# Patient Record
Sex: FEMALE | Race: WHITE | NOT HISPANIC OR LATINO | Employment: PART TIME | URBAN - METROPOLITAN AREA
[De-identification: names, ages, dates, MRNs, and addresses within clinical notes are randomized per-mention and may not be internally consistent; named-entity substitution may affect disease eponyms.]

---

## 2023-03-23 ENCOUNTER — APPOINTMENT (OUTPATIENT)
Dept: PRIMARY CARE | Facility: CLINIC | Age: 43
End: 2023-03-23
Payer: COMMERCIAL

## 2023-04-20 RX ORDER — VENLAFAXINE HYDROCHLORIDE 37.5 MG/1
37.5 CAPSULE, EXTENDED RELEASE ORAL DAILY
COMMUNITY
End: 2023-04-26 | Stop reason: SDUPTHER

## 2023-04-26 DIAGNOSIS — F41.9 ANXIETY: Primary | ICD-10-CM

## 2023-04-26 RX ORDER — VENLAFAXINE HYDROCHLORIDE 37.5 MG/1
37.5 CAPSULE, EXTENDED RELEASE ORAL DAILY
Qty: 90 CAPSULE | Refills: 0 | Status: SHIPPED | OUTPATIENT
Start: 2023-04-26 | End: 2023-07-31 | Stop reason: SDUPTHER

## 2023-04-26 NOTE — TELEPHONE ENCOUNTER
Patient left voicemail on provider refill line for:    Name of medication & dose:  Veblafaxine  Quantity & refills requested: as per last time  Amount of medication remainin days  If out of medication, for how long?      Last office visit:  22  Last labs (if applicable):    Future appointment?  23    Pharmacy verified.  [facility] [place] on file  Allergies updated.       The patient was informed the requested medication request will be processed within 3 business days unless they are contacted by a staff member to advise otherwise.

## 2023-05-11 ENCOUNTER — OFFICE VISIT (OUTPATIENT)
Dept: PRIMARY CARE | Facility: CLINIC | Age: 43
End: 2023-05-11
Payer: COMMERCIAL

## 2023-05-11 VITALS
OXYGEN SATURATION: 97 % | HEART RATE: 92 BPM | WEIGHT: 160 LBS | SYSTOLIC BLOOD PRESSURE: 118 MMHG | DIASTOLIC BLOOD PRESSURE: 78 MMHG

## 2023-05-11 DIAGNOSIS — R42 VERTIGO: ICD-10-CM

## 2023-05-11 DIAGNOSIS — F41.9 ANXIETY: Primary | ICD-10-CM

## 2023-05-11 PROBLEM — G47.9 SLEEP DISTURBANCE: Status: RESOLVED | Noted: 2017-10-16 | Resolved: 2023-05-11

## 2023-05-11 PROBLEM — Z15.89 HOMOZYGOUS FOR MTHFR GENE MUTATION: Status: ACTIVE | Noted: 2021-11-16

## 2023-05-11 PROBLEM — L30.9 ECZEMA: Status: ACTIVE | Noted: 2017-10-16

## 2023-05-11 PROBLEM — R87.610 ASCUS OF CERVIX WITH NEGATIVE HIGH RISK HPV: Status: RESOLVED | Noted: 2017-03-01 | Resolved: 2023-05-11

## 2023-05-11 PROBLEM — Z86.2 HISTORY OF IRON DEFICIENCY ANEMIA: Status: RESOLVED | Noted: 2023-05-11 | Resolved: 2023-05-11

## 2023-05-11 PROBLEM — N84.0 ENDOMETRIAL POLYP: Status: RESOLVED | Noted: 2022-05-23 | Resolved: 2023-05-11

## 2023-05-11 PROBLEM — F52.0 HYPOACTIVE SEXUAL DESIRE DISORDER: Status: RESOLVED | Noted: 2023-05-11 | Resolved: 2023-05-11

## 2023-05-11 PROBLEM — E55.9 VITAMIN D DEFICIENCY: Status: RESOLVED | Noted: 2017-03-01 | Resolved: 2023-05-11

## 2023-05-11 PROBLEM — N95.1 PERIMENOPAUSE: Status: ACTIVE | Noted: 2021-08-09

## 2023-05-11 PROCEDURE — 1036F TOBACCO NON-USER: CPT | Performed by: FAMILY MEDICINE

## 2023-05-11 PROCEDURE — 99213 OFFICE O/P EST LOW 20 MIN: CPT | Performed by: FAMILY MEDICINE

## 2023-05-11 RX ORDER — ONDANSETRON 4 MG/1
TABLET, ORALLY DISINTEGRATING ORAL
COMMUNITY
Start: 2022-06-03

## 2023-05-11 RX ORDER — ESOMEPRAZOLE MAGNESIUM 40 MG/1
CAPSULE, DELAYED RELEASE ORAL
COMMUNITY

## 2023-05-11 RX ORDER — ERYTHROMYCIN 20 MG/G
GEL TOPICAL 2 TIMES DAILY
COMMUNITY
Start: 2022-10-18 | End: 2023-10-09

## 2023-05-11 RX ORDER — SUMATRIPTAN 20 MG/1
1 SPRAY NASAL EVERY 2 HOUR PRN
COMMUNITY
End: 2023-06-01 | Stop reason: SDUPTHER

## 2023-05-11 RX ORDER — ALPRAZOLAM 0.25 MG/1
1 TABLET ORAL 2 TIMES DAILY PRN
COMMUNITY
Start: 2018-10-04 | End: 2024-01-04 | Stop reason: SDUPTHER

## 2023-05-11 RX ORDER — FLUTICASONE PROPIONATE 50 MCG
SPRAY, SUSPENSION (ML) NASAL
COMMUNITY
Start: 2018-07-06

## 2023-05-11 RX ORDER — TIMOLOL MALEATE 2.5 MG/ML
1 SOLUTION/ DROPS OPHTHALMIC 2 TIMES DAILY
COMMUNITY
Start: 2022-06-03 | End: 2023-10-09

## 2023-05-11 ASSESSMENT — PATIENT HEALTH QUESTIONNAIRE - PHQ9
SUM OF ALL RESPONSES TO PHQ9 QUESTIONS 1 AND 2: 0
2. FEELING DOWN, DEPRESSED OR HOPELESS: NOT AT ALL
1. LITTLE INTEREST OR PLEASURE IN DOING THINGS: NOT AT ALL

## 2023-05-25 DIAGNOSIS — G43.119 INTRACTABLE MIGRAINE WITH AURA WITHOUT STATUS MIGRAINOSUS: Primary | ICD-10-CM

## 2023-05-26 RX ORDER — SUMATRIPTAN 20 MG/1
SPRAY NASAL
Refills: 2 | OUTPATIENT
Start: 2023-05-26

## 2023-05-26 NOTE — PROGRESS NOTES
Subjective   Patient ID: Qiana Stevenson is a 42 y.o. female who presents for Anxiety.    She continues to wean her Effexor extremely slowly by opening up the capsules and counting out the granules.  She admits that her anxiety is getting worse and she knows she needs to take something, but she does not want to be Effexor.  She is also concerned that her vertiginous migraines are getting more symptomatic, partly because of her anxiety.    Her previous [facility] testing is reviewed after I was able to find in the chart    Histories reviewed      Objective   /78   Pulse 92   Wt 72.6 kg (160 lb)   SpO2 97%    Physical Exam  Alert adult woman, NAD  Affect pleasant      Problem List Items Addressed This Visit          Other    Vertigo    Relevant Medications    vortioxetine (Trintellix) 5 mg tablet tablet     Other Visit Diagnoses       Anxiety    -  Primary    Relevant Medications    vortioxetine (Trintellix) 5 mg tablet tablet        After much discussion she agreed to try something new.  Personally, I think if she adds very low-dose Trintellix, she will break it in half at first, that she will be able to wean off the Effexor a little faster.  I would like to see her back in just a couple months.

## 2023-06-01 RX ORDER — SUMATRIPTAN 20 MG/1
SPRAY NASAL
Qty: 6 EACH | Refills: 1 | Status: SHIPPED | OUTPATIENT
Start: 2023-06-01 | End: 2023-07-31

## 2023-06-01 NOTE — TELEPHONE ENCOUNTER
----- Message from Qiana Stevenson sent at 6/1/2023  4:18 PM EDT -----  Regarding: Your Recent Visit  Contact: [phone]  Hello! Can you please refill my Sumatriptan nasal spray? I don't think it went through and for some reason I can't request a refill through the kody.     Thank you!

## 2023-07-30 DIAGNOSIS — G43.119 INTRACTABLE MIGRAINE WITH AURA WITHOUT STATUS MIGRAINOSUS: ICD-10-CM

## 2023-07-31 DIAGNOSIS — F41.9 ANXIETY: ICD-10-CM

## 2023-07-31 RX ORDER — VENLAFAXINE HYDROCHLORIDE 37.5 MG/1
37.5 CAPSULE, EXTENDED RELEASE ORAL DAILY
Qty: 90 CAPSULE | Refills: 0 | Status: SHIPPED | OUTPATIENT
Start: 2023-07-31 | End: 2023-10-25 | Stop reason: SDUPTHER

## 2023-07-31 RX ORDER — SUMATRIPTAN 20 MG/1
SPRAY NASAL
Qty: 6 EACH | Refills: 1 | Status: SHIPPED | OUTPATIENT
Start: 2023-07-31 | End: 2023-09-25

## 2023-07-31 NOTE — TELEPHONE ENCOUNTER
Patient left voicemail and sent Youtuot message. Only has 1 pill left for today.  Last visit 5/11/23

## 2023-09-25 DIAGNOSIS — G43.119 INTRACTABLE MIGRAINE WITH AURA WITHOUT STATUS MIGRAINOSUS: ICD-10-CM

## 2023-09-25 RX ORDER — SUMATRIPTAN 20 MG/1
SPRAY NASAL
Qty: 6 EACH | Refills: 1 | Status: SHIPPED | OUTPATIENT
Start: 2023-09-25 | End: 2023-11-20

## 2023-10-05 RX ORDER — ALPRAZOLAM 0.5 MG/1
TABLET ORAL AS NEEDED
COMMUNITY

## 2023-10-05 RX ORDER — ONDANSETRON 4 MG/1
4 TABLET, FILM COATED ORAL EVERY 8 HOURS PRN
COMMUNITY
Start: 2018-12-11

## 2023-10-05 RX ORDER — MINERAL OIL
ENEMA (ML) RECTAL
COMMUNITY
Start: 2023-04-27 | End: 2023-10-09

## 2023-10-05 RX ORDER — ALBUTEROL SULFATE 90 UG/1
1-2 AEROSOL, METERED RESPIRATORY (INHALATION)
COMMUNITY
Start: 2022-12-22 | End: 2023-10-09

## 2023-10-05 RX ORDER — TERCONAZOLE 8 MG/G
1 CREAM VAGINAL NIGHTLY
COMMUNITY
Start: 2022-12-27

## 2023-10-05 RX ORDER — BENZONATATE 200 MG/1
1 CAPSULE ORAL 3 TIMES DAILY PRN
COMMUNITY
Start: 2022-12-22 | End: 2023-10-09

## 2023-10-05 RX ORDER — TACROLIMUS 1 MG/G
OINTMENT TOPICAL 2 TIMES DAILY
COMMUNITY
Start: 2022-10-18 | End: 2023-10-09 | Stop reason: ALTCHOICE

## 2023-10-05 RX ORDER — RIBOFLAVIN (VITAMIN B2) 100 MG
100 TABLET ORAL DAILY
COMMUNITY
Start: 2017-03-27

## 2023-10-05 RX ORDER — LORATADINE 10 MG/1
10 TABLET ORAL
COMMUNITY

## 2023-10-05 RX ORDER — ACETAMINOPHEN 500 MG
1 TABLET ORAL
COMMUNITY
Start: 2017-03-28

## 2023-10-05 RX ORDER — PNV NO.95/FERROUS FUM/FOLIC AC 28MG-0.8MG
1 TABLET ORAL DAILY
COMMUNITY

## 2023-10-09 ENCOUNTER — OFFICE VISIT (OUTPATIENT)
Dept: DERMATOLOGY | Facility: CLINIC | Age: 43
End: 2023-10-09
Payer: COMMERCIAL

## 2023-10-09 DIAGNOSIS — L71.0 PERIORAL DERMATITIS: Primary | ICD-10-CM

## 2023-10-09 DIAGNOSIS — D22.9 MELANOCYTIC NEVUS, UNSPECIFIED LOCATION: ICD-10-CM

## 2023-10-09 DIAGNOSIS — L57.8 OTHER SKIN CHANGES DUE TO CHRONIC EXPOSURE TO NONIONIZING RADIATION: ICD-10-CM

## 2023-10-09 DIAGNOSIS — D18.01 HEMANGIOMA OF SKIN: ICD-10-CM

## 2023-10-09 DIAGNOSIS — R20.2 NOTALGIA PARESTHETICA: ICD-10-CM

## 2023-10-09 PROCEDURE — 1036F TOBACCO NON-USER: CPT | Performed by: STUDENT IN AN ORGANIZED HEALTH CARE EDUCATION/TRAINING PROGRAM

## 2023-10-09 PROCEDURE — 99204 OFFICE O/P NEW MOD 45 MIN: CPT | Performed by: STUDENT IN AN ORGANIZED HEALTH CARE EDUCATION/TRAINING PROGRAM

## 2023-10-09 RX ORDER — TACROLIMUS 1 MG/G
OINTMENT TOPICAL 2 TIMES DAILY
Qty: 60 G | Refills: 5 | Status: SHIPPED | OUTPATIENT
Start: 2023-10-09 | End: 2024-05-23 | Stop reason: ALTCHOICE

## 2023-10-09 NOTE — PROGRESS NOTES
Subjective     Qiana Stevenson is a 43 y.o. female who presents for the following: Skin Check (Discoloration and numbness on back started 12 years ago. Today numb and itchy.) and Rash (Hx of perioral dermatitis. Tacrolimus in the past with good response.).     Review of Systems:  No other skin or systemic complaints other than what is documented elsewhere in the note.    The following portions of the chart were reviewed this encounter and updated as appropriate:         Skin Cancer History  No skin cancer on file.      Specialty Problems          Dermatology Problems    Eczema        Objective   Well appearing patient in no apparent distress; mood and affect are within normal limits.    A full examination was performed including scalp, head, eyes, ears, nose, lips, neck, chest, axillae, abdomen, back, buttocks, bilateral upper extremities, bilateral lower extremities, hands, feet, fingers, toes, fingernails, and toenails. All findings within normal limits unless otherwise noted below.    Assessment/Plan   1. Perioral dermatitis  Head - Anterior (Face)  Erythema around mouth and nasolabial fold    Periorificial dermatitis- discussed diagnosis, subtype of rosacea. STOP steroid use to face. Wash face after using Flonase. Start protopic 0.1% ointment BID to affected areas until resolved. Discussed black box warning.     tacrolimus (Protopic) 0.1 % ointment - Head - Anterior (Face)  Apply topically 2 times a day. To face, until rash clears    Related Procedures  Follow Up In Dermatology - Established Patient    2. Notalgia paresthetica  Right Upper Back  Hyperpigmented subscapular patch    Discussed diagnosis, associated with nerve impingement/spinal issues.  Leads to amyloid deposition and dysesthesia of back.  Start Cerave anti itch lotion as needed to area.  Advised fu with PCP regarding back pain for definitive treatment.     3. Other skin changes due to chronic exposure to nonionizing radiation  Mottled brown and  red discoloration    The risk of chronic, cumulative sun damage and risk of development of skin cancer was reviewed with the patient today. Discussed important of sun protection with sun protective clothing and/or broad spectrum sunscreen spf 30 or above.  Warning signs of skin cancer were reviewed. Patient to contact office should they notice any new or changing pre-existing skin lesion.    4. Melanocytic nevus, unspecified location  Benign to slightly atypical appearing brown pigmented macules and papules    Clinically benign appearing nevi, reassurance provided to the patient today. Discussed important of sun protection with sun protective clothing and/or broad spectrum sunscreen spf 30 or above.  ABCDEs of melanoma reviewed. Patient to f/u should they notice any new or changing pre-existing skin lesion.    5. Hemangioma of skin  Bright red papules    Discussed benign nature of condition, reassured. Reviewed warning signs of skin cancer with patient.    FU 1 year    Daria Jameson MD

## 2023-10-18 ENCOUNTER — OFFICE VISIT (OUTPATIENT)
Dept: PRIMARY CARE | Facility: CLINIC | Age: 43
End: 2023-10-18
Payer: COMMERCIAL

## 2023-10-18 VITALS — HEART RATE: 86 BPM | SYSTOLIC BLOOD PRESSURE: 128 MMHG | OXYGEN SATURATION: 98 % | DIASTOLIC BLOOD PRESSURE: 83 MMHG

## 2023-10-18 DIAGNOSIS — R35.0 INCREASED URINARY FREQUENCY: Primary | ICD-10-CM

## 2023-10-18 LAB
POC APPEARANCE, URINE: CLEAR
POC BILIRUBIN, URINE: NEGATIVE
POC BLOOD, URINE: NEGATIVE
POC COLOR, URINE: YELLOW
POC GLUCOSE, URINE: NEGATIVE MG/DL
POC KETONES, URINE: NEGATIVE MG/DL
POC LEUKOCYTES, URINE: NEGATIVE
POC NITRITE,URINE: NEGATIVE
POC PH, URINE: 6 PH
POC PROTEIN, URINE: NEGATIVE MG/DL
POC SPECIFIC GRAVITY, URINE: 1.01
POC UROBILINOGEN, URINE: 1 EU/DL

## 2023-10-18 PROCEDURE — 87086 URINE CULTURE/COLONY COUNT: CPT

## 2023-10-18 PROCEDURE — 1036F TOBACCO NON-USER: CPT | Performed by: FAMILY MEDICINE

## 2023-10-18 PROCEDURE — 81003 URINALYSIS AUTO W/O SCOPE: CPT | Performed by: FAMILY MEDICINE

## 2023-10-18 PROCEDURE — 99213 OFFICE O/P EST LOW 20 MIN: CPT | Performed by: FAMILY MEDICINE

## 2023-10-18 RX ORDER — ERGOCALCIFEROL 1.25 MG/1
CAPSULE ORAL
COMMUNITY
Start: 2023-10-02

## 2023-10-18 RX ORDER — PHENAZOPYRIDINE HYDROCHLORIDE 100 MG/1
100 TABLET, FILM COATED ORAL 3 TIMES DAILY PRN
Qty: 15 TABLET | Refills: 0 | Status: SHIPPED | OUTPATIENT
Start: 2023-10-18

## 2023-10-18 NOTE — PROGRESS NOTES
Subjective   Patient ID: Qiana Stevenson is a 43 y.o. female who presents for Urinary Frequency (Urinary burning and frequency for 3 days).  She also has a feeling of incomplete emptying.  She denies any ill symptoms or fever.  No nausea and no significant flank pain.  Last UTI was many years ago.    Histories reviewed      Objective   /83   Pulse 86   SpO2 98%    Physical Exam  Alert adult, NAD  Affect pleasant  Heart RRR no murmur  Lungs CTA bilat  No CVA tenderness    Problem List Items Addressed This Visit    None  Visit Diagnoses         Codes    Increased urinary frequency    -  Primary R35.0    Relevant Medications    phenazopyridine (Pyridium) 100 mg tablet    Other Relevant Orders    POCT UA Automated manually resulted (Completed)    Urine Culture (Completed)        Start Pyridium pending culture.  She did not want start antibiotics until the culture is back and I totally agree because her UA did not seem significantly positive.

## 2023-10-20 LAB — BACTERIA UR CULT: NO GROWTH

## 2023-10-25 DIAGNOSIS — F41.9 ANXIETY: ICD-10-CM

## 2023-10-26 RX ORDER — VENLAFAXINE HYDROCHLORIDE 37.5 MG/1
37.5 CAPSULE, EXTENDED RELEASE ORAL DAILY
Qty: 90 CAPSULE | Refills: 0 | Status: SHIPPED | OUTPATIENT
Start: 2023-10-26 | End: 2024-01-04 | Stop reason: SDUPTHER

## 2023-11-20 DIAGNOSIS — G43.119 INTRACTABLE MIGRAINE WITH AURA WITHOUT STATUS MIGRAINOSUS: ICD-10-CM

## 2023-11-20 RX ORDER — SUMATRIPTAN 20 MG/1
SPRAY NASAL
Qty: 6 EACH | Refills: 2 | Status: SHIPPED | OUTPATIENT
Start: 2023-11-20 | End: 2024-02-22 | Stop reason: SDUPTHER

## 2024-01-04 ENCOUNTER — OFFICE VISIT (OUTPATIENT)
Dept: PRIMARY CARE | Facility: CLINIC | Age: 44
End: 2024-01-04
Payer: COMMERCIAL

## 2024-01-04 VITALS
SYSTOLIC BLOOD PRESSURE: 109 MMHG | WEIGHT: 149 LBS | DIASTOLIC BLOOD PRESSURE: 74 MMHG | OXYGEN SATURATION: 97 % | HEART RATE: 76 BPM

## 2024-01-04 DIAGNOSIS — F41.9 ANXIETY: ICD-10-CM

## 2024-01-04 PROCEDURE — 99213 OFFICE O/P EST LOW 20 MIN: CPT | Performed by: FAMILY MEDICINE

## 2024-01-04 PROCEDURE — 1036F TOBACCO NON-USER: CPT | Performed by: FAMILY MEDICINE

## 2024-01-04 RX ORDER — ALPRAZOLAM 0.25 MG/1
0.25 TABLET ORAL 2 TIMES DAILY PRN
Qty: 30 TABLET | Refills: 0 | Status: SHIPPED | OUTPATIENT
Start: 2024-01-04

## 2024-01-04 RX ORDER — PROGESTERONE 100 MG/1
100 CAPSULE ORAL
COMMUNITY
Start: 2023-12-19

## 2024-01-04 RX ORDER — VENLAFAXINE HYDROCHLORIDE 37.5 MG/1
37.5 CAPSULE, EXTENDED RELEASE ORAL DAILY
Qty: 90 CAPSULE | Refills: 1 | Status: SHIPPED | OUTPATIENT
Start: 2024-01-04

## 2024-01-04 RX ORDER — ONDANSETRON 4 MG/1
4 TABLET, FILM COATED ORAL EVERY 8 HOURS PRN
Qty: 20 TABLET | Status: CANCELLED | OUTPATIENT
Start: 2024-01-04

## 2024-01-04 RX ORDER — ONDANSETRON 4 MG/1
4 TABLET, ORALLY DISINTEGRATING ORAL EVERY 8 HOURS PRN
Qty: 20 TABLET | Refills: 1 | Status: SHIPPED | OUTPATIENT
Start: 2024-01-04 | End: 2024-02-03

## 2024-01-04 NOTE — PROGRESS NOTES
Subjective   Patient ID: Qiana Stevenson is a 43 y.o. female who presents for Anxiety (Patient is here for anxiety follow up and med refill. ).  She continues to have a lot of stress with her kids, son is autistic and daughter has been struggling with severe anxiety and new autism diagnosis.  This is always a tough time of year for her.  She had been weaning off of her Effexor but in October she decided to go back up to the 37.5 mg dose and she is just staying on that for now.  She denies significant depression.    She had a bad GI bug a few weeks ago and had to get 3 liters fluids in the ER.  This has triggered her GI anxiety recently and she has used the xanax and zofran a little more than usual.  Last Xanax Rx was 15 months ago.      Histories reviewed      Objective   /74   Pulse 76   Wt 67.6 kg (149 lb)   LMP 12/13/2023   SpO2 97%    Physical Exam    Alert adult, NAD  Affect pleasant  Heart RRR no murmur  Lungs CTA bilat      Problem List Items Addressed This Visit             ICD-10-CM    Anxiety F41.9    Relevant Medications    ALPRAZolam (Xanax) 0.25 mg tablet    ondansetron ODT (Zofran-ODT) 4 mg disintegrating tablet    venlafaxine XR (Effexor-XR) 37.5 mg 24 hr capsule

## 2024-01-12 ENCOUNTER — APPOINTMENT (OUTPATIENT)
Dept: PRIMARY CARE | Facility: CLINIC | Age: 44
End: 2024-01-12
Payer: COMMERCIAL

## 2024-01-12 ENCOUNTER — TELEPHONE (OUTPATIENT)
Dept: PRIMARY CARE | Facility: CLINIC | Age: 44
End: 2024-01-12

## 2024-01-12 DIAGNOSIS — D50.9 IRON DEFICIENCY ANEMIA, UNSPECIFIED IRON DEFICIENCY ANEMIA TYPE: Primary | ICD-10-CM

## 2024-01-12 NOTE — TELEPHONE ENCOUNTER
Patient called with questions about her recent Pap smear which was abnormal.  When she was called about her abnormal Pap showing atypical glandular cells from her OB/GYN office, she also looked at some recent labs and urine results from an ER visit and was very concerned.  I reassured her about the Pap smear and she already is working to set up follow-up with an endometrial biopsy.  The labs are really not anything unusual and the urine abnormalities are consistent with the fact that she was dehydrated and had terrible viral gastroenteritis.  The only thing I think that needs follow-up is that her MCV and MCH were very low and she does have a history of iron deficiency anemia so we will repeat iron and CBC and she will have that done in the next week.    Anca Mckinley MD

## 2024-02-21 DIAGNOSIS — G43.119 INTRACTABLE MIGRAINE WITH AURA WITHOUT STATUS MIGRAINOSUS: ICD-10-CM

## 2024-02-22 RX ORDER — SUMATRIPTAN 20 MG/1
SPRAY NASAL
Refills: 2 | OUTPATIENT
Start: 2024-02-22

## 2024-02-22 RX ORDER — SUMATRIPTAN 20 MG/1
1 SPRAY NASAL DAILY PRN
Qty: 6 EACH | Refills: 2 | Status: SHIPPED | OUTPATIENT
Start: 2024-02-22 | End: 2024-05-21 | Stop reason: SDUPTHER

## 2024-02-22 NOTE — TELEPHONE ENCOUNTER
Left voicemail on prescription line  Last kody. 1/4/2024, for anxiety. Med given 11/20/2023 with 6 each and 2 refill.

## 2024-03-25 ENCOUNTER — OFFICE VISIT (OUTPATIENT)
Dept: PRIMARY CARE | Facility: CLINIC | Age: 44
End: 2024-03-25
Payer: COMMERCIAL

## 2024-03-25 DIAGNOSIS — D50.9 IRON DEFICIENCY ANEMIA, UNSPECIFIED IRON DEFICIENCY ANEMIA TYPE: ICD-10-CM

## 2024-03-25 DIAGNOSIS — F43.9 STRESS: ICD-10-CM

## 2024-03-25 PROCEDURE — 99214 OFFICE O/P EST MOD 30 MIN: CPT | Performed by: FAMILY MEDICINE

## 2024-03-25 PROCEDURE — 1036F TOBACCO NON-USER: CPT | Performed by: FAMILY MEDICINE

## 2024-03-25 PROCEDURE — 3008F BODY MASS INDEX DOCD: CPT | Performed by: FAMILY MEDICINE

## 2024-03-25 NOTE — PROGRESS NOTES
Subjective   Patient ID: Qiana Stevenson is a 43 y.o. female who presents for Obesity (Patient is here to discuss her weight gain. She says the last year has been really difficult for her to maintain her weight. ).    Her weight in early 2021 was 132 lbs and one year later was up to 155 lbs.  Actually over the past year her weight has been stable.    Current diet is pretty healthy but not restrictive in any way.      She is very anemic and iron def again and will be having iron infusions again.  She gets winded walking up a flight of stairs, so she cannot exercise fully.  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   She is a little overworked and it is stressful.  She sleeping enough.  Her marriage is fine.      She is still on 37.5 mg effexor daily and it is helping her anxiety a lot and helps the vestibular stuff a lot also.  When she tries to wean lower the vertigo gets worse.  So many thinks affect her migraines also so they are unpredictable.     Histories reviewed      Objective   /76   Pulse 77   Ht 1.524 m (5')   Wt 68 kg (150 lb)   LMP 03/04/2024   SpO2 98%   BMI 29.29 kg/m²    Physical Exam    Alert adult, NAD  Affect pleasant  Heart RRR no murmur  Lungs CTA bilat      Problem List Items Addressed This Visit    None  Visit Diagnoses         Codes    BMI 29.0-29.9,adult    -  Primary Z68.29    Stress     F43.9    Iron deficiency anemia, unspecified iron deficiency anemia type     D50.9          We talked at length about her weight and how it is affected by her stress, and the many other factors, exspecially her iron def anemia.  We discussed dieting, and increasing her activity when the anemia is better.  I think talk therapy for herself is a good consideration also.

## 2024-03-27 DIAGNOSIS — L71.0 PERIORAL DERMATITIS: Primary | ICD-10-CM

## 2024-03-27 RX ORDER — METRONIDAZOLE 7.5 MG/G
1 CREAM TOPICAL DAILY
Qty: 45 G | Refills: 11 | Status: SHIPPED | OUTPATIENT
Start: 2024-03-27

## 2024-04-07 VITALS
BODY MASS INDEX: 29.45 KG/M2 | OXYGEN SATURATION: 98 % | SYSTOLIC BLOOD PRESSURE: 124 MMHG | DIASTOLIC BLOOD PRESSURE: 76 MMHG | HEART RATE: 77 BPM | WEIGHT: 150 LBS | HEIGHT: 60 IN

## 2024-05-21 ENCOUNTER — TELEPHONE (OUTPATIENT)
Dept: PRIMARY CARE | Facility: CLINIC | Age: 44
End: 2024-05-21
Payer: COMMERCIAL

## 2024-05-21 DIAGNOSIS — G43.119 INTRACTABLE MIGRAINE WITH AURA WITHOUT STATUS MIGRAINOSUS: ICD-10-CM

## 2024-05-22 RX ORDER — SUMATRIPTAN 20 MG/1
1 SPRAY NASAL DAILY PRN
Qty: 6 EACH | Refills: 2 | Status: SHIPPED | OUTPATIENT
Start: 2024-05-22

## 2024-05-23 DIAGNOSIS — L71.0 PERIORIFICIAL DERMATITIS: Primary | ICD-10-CM

## 2024-05-23 RX ORDER — TACROLIMUS 1 MG/G
OINTMENT TOPICAL 2 TIMES DAILY
Qty: 60 G | Refills: 11 | Status: SHIPPED | OUTPATIENT
Start: 2024-05-23 | End: 2025-05-23

## 2024-07-11 ENCOUNTER — TELEPHONE (OUTPATIENT)
Dept: PRIMARY CARE | Facility: CLINIC | Age: 44
End: 2024-07-11
Payer: COMMERCIAL

## 2024-07-11 DIAGNOSIS — F41.9 ANXIETY: ICD-10-CM

## 2024-07-11 RX ORDER — VENLAFAXINE HYDROCHLORIDE 37.5 MG/1
37.5 CAPSULE, EXTENDED RELEASE ORAL DAILY
Qty: 90 CAPSULE | Refills: 0 | Status: SHIPPED | OUTPATIENT
Start: 2024-07-11

## 2024-08-12 ENCOUNTER — TELEPHONE (OUTPATIENT)
Dept: PRIMARY CARE | Facility: CLINIC | Age: 44
End: 2024-08-12
Payer: COMMERCIAL

## 2024-08-15 ENCOUNTER — TELEMEDICINE (OUTPATIENT)
Dept: PRIMARY CARE | Facility: CLINIC | Age: 44
End: 2024-08-15
Payer: COMMERCIAL

## 2024-08-15 DIAGNOSIS — U07.1 COVID-19: Primary | ICD-10-CM

## 2024-08-15 PROCEDURE — 1036F TOBACCO NON-USER: CPT | Performed by: FAMILY MEDICINE

## 2024-08-15 PROCEDURE — 99213 OFFICE O/P EST LOW 20 MIN: CPT | Performed by: FAMILY MEDICINE

## 2024-08-15 RX ORDER — BENZONATATE 200 MG/1
200 CAPSULE ORAL 3 TIMES DAILY PRN
Qty: 42 CAPSULE | Refills: 0 | Status: SHIPPED | OUTPATIENT
Start: 2024-08-15 | End: 2024-09-14

## 2024-08-15 ASSESSMENT — ENCOUNTER SYMPTOMS
WHEEZING: 0
FEVER: 1
HEMOPTYSIS: 0
COUGH: 1
SORE THROAT: 1
HEADACHES: 1
WEIGHT LOSS: 0
SHORTNESS OF BREATH: 0
RHINORRHEA: 1
HEARTBURN: 0
MYALGIAS: 0
CHILLS: 1
SWEATS: 0

## 2024-08-15 NOTE — PROGRESS NOTES
This is a VIRTUAL/TELEPHONE visit in place of a scheduled office visit due to current Covid-19 situation.  Pt is informed at the beginning of the call that he/she may be billed for this virtual/telephone appointment and if the insurance company does not cover this service, the pt may be billed by .    Total phone visit time: 15 minutes    Subjective   Patient ID: Qiana Stevenson is a 43 y.o. female who presents for Covid-19 Home Monitoring Video Visit (Patient tested positive for COVID. Symptoms include cough, fever,chills and congestion. The sxs started Monday night. ). The cough is the part for pt today, but yesterday was headache and fever.      Strep [facility] neg [facility], and she told them covid test was neg 2 days ago.  They advised her to test again and was positive.  She does not want Paxlovid, reviewed as an option.      Histories reviewed        Answers submitted by the patient for this visit:  Cough Questionnaire (Submitted on 8/15/2024)  Chief Complaint: Cough  Chronicity: new  Onset: in the past 7 days  Progression since onset: rapidly worsening  Frequency: every few minutes  Cough characteristics: non-productive  chest pain: No  chills: Yes  ear congestion: No  ear pain: No  fever: Yes  headaches: Yes  heartburn: No  hemoptysis: No  myalgias: No  nasal congestion: Yes  postnasal drip: Yes  rash: No  rhinorrhea: Yes  shortness of breath: No  sore throat: Yes  sweats: No  weight loss: No  wheezing: No  Aggravated by: lying down      Objective   There were no vitals taken for this visit.   Physical Exam    Alert adult woman, very hoarse voice.  She can speak normally  No resp distress but lots of cough.    Problem List Items Addressed This Visit    None  Visit Diagnoses         Codes    COVID-19    -  Primary U07.1    Relevant Medications    benzonatate (Tessalon) 200 mg capsule          Reviewed current isolation versus quarantine guidelines.  Reviewed guidelines for day 0 through day  10.  Reviewed symptom treatment with over-the-counter medications.    Reviewed what to watch for and when to call the doctor.  Discussed Paxlovid as an option, pros and cons.

## 2024-09-09 ENCOUNTER — HOSPITAL ENCOUNTER (OUTPATIENT)
Dept: RADIOLOGY | Facility: CLINIC | Age: 44
Discharge: HOME | End: 2024-09-09
Payer: COMMERCIAL

## 2024-09-09 ENCOUNTER — OFFICE VISIT (OUTPATIENT)
Dept: PRIMARY CARE | Facility: CLINIC | Age: 44
End: 2024-09-09
Payer: COMMERCIAL

## 2024-09-09 VITALS
DIASTOLIC BLOOD PRESSURE: 59 MMHG | SYSTOLIC BLOOD PRESSURE: 107 MMHG | BODY MASS INDEX: 28.32 KG/M2 | WEIGHT: 145 LBS | HEART RATE: 96 BPM | OXYGEN SATURATION: 96 % | TEMPERATURE: 98.3 F

## 2024-09-09 DIAGNOSIS — J40 BRONCHITIS: Primary | ICD-10-CM

## 2024-09-09 DIAGNOSIS — J40 BRONCHITIS: ICD-10-CM

## 2024-09-09 PROCEDURE — 99213 OFFICE O/P EST LOW 20 MIN: CPT | Performed by: FAMILY MEDICINE

## 2024-09-09 PROCEDURE — 71046 X-RAY EXAM CHEST 2 VIEWS: CPT | Performed by: RADIOLOGY

## 2024-09-09 PROCEDURE — 71046 X-RAY EXAM CHEST 2 VIEWS: CPT

## 2024-09-09 RX ORDER — DOXYCYCLINE 100 MG/1
100 CAPSULE ORAL 2 TIMES DAILY
Qty: 14 CAPSULE | Refills: 0 | Status: SHIPPED | OUTPATIENT
Start: 2024-09-09 | End: 2024-09-16

## 2024-09-09 RX ORDER — MOMETASONE FUROATE 220 UG/1
1 INHALANT RESPIRATORY (INHALATION) DAILY
Qty: 1 EACH | Refills: 0 | Status: SHIPPED | OUTPATIENT
Start: 2024-09-09 | End: 2024-09-11 | Stop reason: SDUPTHER

## 2024-09-09 NOTE — PROGRESS NOTES
Subjective   Patient ID: Qiana Stevenson is a 43 y.o. female who presents for Chest Pain (Patient had COVID for 2 weeks and has had a cough since. Her chest is very congested and she has a deep cough that is getting worse. She is also suffering from fatigue. ).    Before she got better from Covid she feels like she caught something else.  They traveled to [place] and she started coughing a lot and feeling very tired.  She has been sick ever since.   She feels like she has never been this sick.      Histories reviewed      Objective   /59   Pulse 96   Temp 36.8 °C (98.3 °F)   Wt 65.8 kg (145 lb)   LMP 08/27/2024   SpO2 96%   BMI 28.32 kg/m²    Physical Exam    Alert adult, NAD, body wt normal  Affect pleasant and appropriate, very tired  Eyes: PERRLA, EOMI, clear bilat  Nose congested  Neck supple, No LAD, No thyromegaly  Heart RRR no murmur  Lungs CTA bilat  Skin warm dry and clear        Problem List Items Addressed This Visit    None  Visit Diagnoses         Codes    Bronchitis    -  Primary J40    Relevant Medications    doxycycline (Vibramycin) 100 mg capsule    Other Relevant Orders    XR chest 2 views (Completed)          I suggested zithromax for possible CAP, but pt insisted that zithromax gave her severe side effects in the past.    URI symptom relief discussed as follows:  Rest and frequent hydration with clear liquids  Cough drops and honey as needed for cough. 1 spoonful of honey by mouth at least 3-4 times a day  Frequent use of nasal saline for nasal congestion and runny nose.    Humidifier as needed  Decongestant as needed, for example OTC pseudoephedrine  Tylenol alternating with ibuprofen as needed for fever, body aches or headache

## 2024-09-10 ENCOUNTER — PATIENT MESSAGE (OUTPATIENT)
Dept: PRIMARY CARE | Facility: CLINIC | Age: 44
End: 2024-09-10
Payer: COMMERCIAL

## 2024-09-10 DIAGNOSIS — J40 BRONCHITIS: Primary | ICD-10-CM

## 2024-09-11 ENCOUNTER — TELEPHONE (OUTPATIENT)
Dept: PRIMARY CARE | Facility: CLINIC | Age: 44
End: 2024-09-11
Payer: COMMERCIAL

## 2024-09-11 DIAGNOSIS — J40 BRONCHITIS: ICD-10-CM

## 2024-09-11 RX ORDER — FLUTICASONE PROPIONATE AND SALMETEROL 250; 50 UG/1; UG/1
1 POWDER RESPIRATORY (INHALATION)
Qty: 60 EACH | Refills: 0 | Status: SHIPPED | OUTPATIENT
Start: 2024-09-11 | End: 2025-09-11

## 2024-09-11 RX ORDER — MOMETASONE FUROATE 220 UG/1
1 INHALANT RESPIRATORY (INHALATION) DAILY
Qty: 1 EACH | Refills: 0 | Status: SHIPPED | OUTPATIENT
Start: 2024-09-11

## 2024-09-11 NOTE — TELEPHONE ENCOUNTER
Pt spouse called requesting med to go to different pharmacy. Pt spouse stated [facility] where [facility] is currently out of stock of med. New pharmacy on file.Last OV 9.9.24.

## 2024-09-12 ENCOUNTER — PATIENT MESSAGE (OUTPATIENT)
Dept: PRIMARY CARE | Facility: CLINIC | Age: 44
End: 2024-09-12
Payer: COMMERCIAL

## 2024-09-12 DIAGNOSIS — J18.9 PNEUMONIA DUE TO INFECTIOUS ORGANISM, UNSPECIFIED LATERALITY, UNSPECIFIED PART OF LUNG: Primary | ICD-10-CM

## 2024-09-12 RX ORDER — CEPHALEXIN 500 MG/1
500 CAPSULE ORAL 4 TIMES DAILY
Qty: 28 CAPSULE | Refills: 0 | Status: SHIPPED | OUTPATIENT
Start: 2024-09-12 | End: 2024-09-19

## 2024-09-17 ENCOUNTER — PATIENT MESSAGE (OUTPATIENT)
Dept: PRIMARY CARE | Facility: CLINIC | Age: 44
End: 2024-09-17

## 2024-09-17 ENCOUNTER — APPOINTMENT (OUTPATIENT)
Dept: PRIMARY CARE | Facility: CLINIC | Age: 44
End: 2024-09-17
Payer: COMMERCIAL

## 2024-09-17 DIAGNOSIS — J18.9 COMMUNITY ACQUIRED PNEUMONIA, UNSPECIFIED LATERALITY: Primary | ICD-10-CM

## 2024-09-18 ENCOUNTER — OFFICE VISIT (OUTPATIENT)
Dept: PULMONOLOGY | Facility: CLINIC | Age: 44
End: 2024-09-18
Payer: COMMERCIAL

## 2024-09-18 ENCOUNTER — HOSPITAL ENCOUNTER (OUTPATIENT)
Dept: RADIOLOGY | Facility: HOSPITAL | Age: 44
Discharge: HOME | End: 2024-09-18
Payer: COMMERCIAL

## 2024-09-18 VITALS
SYSTOLIC BLOOD PRESSURE: 95 MMHG | BODY MASS INDEX: 28.66 KG/M2 | WEIGHT: 146 LBS | TEMPERATURE: 97.9 F | HEART RATE: 73 BPM | DIASTOLIC BLOOD PRESSURE: 61 MMHG | HEIGHT: 60 IN | OXYGEN SATURATION: 96 %

## 2024-09-18 DIAGNOSIS — J18.9 COMMUNITY ACQUIRED PNEUMONIA, UNSPECIFIED LATERALITY: ICD-10-CM

## 2024-09-18 PROCEDURE — 71046 X-RAY EXAM CHEST 2 VIEWS: CPT

## 2024-09-18 PROCEDURE — 3008F BODY MASS INDEX DOCD: CPT

## 2024-09-18 PROCEDURE — 99204 OFFICE O/P NEW MOD 45 MIN: CPT

## 2024-09-18 PROCEDURE — 1036F TOBACCO NON-USER: CPT

## 2024-09-18 RX ORDER — PREDNISONE 10 MG/1
TABLET ORAL
Qty: 30 TABLET | Refills: 0 | Status: SHIPPED | OUTPATIENT
Start: 2024-09-18 | End: 2024-10-18

## 2024-09-18 RX ORDER — ALBUTEROL SULFATE 0.83 MG/ML
2.5 SOLUTION RESPIRATORY (INHALATION) 4 TIMES DAILY PRN
Qty: 60 ML | Refills: 3 | Status: SHIPPED | OUTPATIENT
Start: 2024-09-18 | End: 2025-09-18

## 2024-09-18 ASSESSMENT — ENCOUNTER SYMPTOMS
LOSS OF SENSATION IN FEET: 0
DEPRESSION: 0
OCCASIONAL FEELINGS OF UNSTEADINESS: 0

## 2024-09-18 ASSESSMENT — PAIN SCALES - GENERAL: PAINLEVEL: 5

## 2024-09-18 NOTE — PROGRESS NOTES
Patient: Qiana Stevenson    [id number]  : 1980 -- AGE 43 y.o.    Provider: Reanna VEGA- CNP     [place] [facility] [place]   Service Date: 2024              [facility]  New Visit Note        HISTORY OF PRESENT ILLNESS     The patient's referring provider is: Anca Mckinley MD    HISTORY OF PRESENT ILLNESS   Qiana Stevenson is a 43 y.o. female with a history of migraines, GERD, who is a never smoker, who presents to a [facility] for an initial evaluation for Pneumonia.     I have independently interviewed and examined the patient in the office and reviewed available records.    Current History    On today's visit, the patient reports testing positive for COVID the second week of August, her symptoms were pretty bad for about 2 weeks.  She reports she was starting to feel better just had a little lingering cough and some mild fatigue she went to [place] Labor Day weekend, her cough started to ramp back up, was feeling increase in fatigue walking around [place], had to go lay down, tested negative for COVID.  Went to see PCP on 2024 chest x-ray was suggestive for pneumonia, she was ordered doxyxline and an Asmanex inhaler. She reports having allergies to penicillins and has a hard time keeping down antibiotics, they make her stomach.  Doxycycline is making her throat so she was switched to cephalexin.  She has 1 or 2 days left of antibiotics with minimal improvement in symptoms.    She reports cough, shortness of breath, chest tightness, occasional wheezing.  Cough is not very productive.  Reports feeling better when she sleeps on her stomach.    Previous pulmonary history: She has no history of recurrent infections, or lung disease as a child.  she had no previous lung hx, never on oxygen or inhaler therapy.     Inhalers/nebulized medications: Asmanex    Hospitalization History: She has not been hospitalized over  the last year for breathing related problem.    Sleep history: Denies snoring, apnea, feeling tired during the day or taking naps during the day.       ALLERGIES AND MEDICATIONS     ALLERGIES  Allergies   Allergen Reactions    Iodine Anaphylaxis    Levofloxacin Rash    Penicillins Unknown, Dizziness, Other, Rash and Shortness of breath    Shellfish Derived Unknown, Hives and Anaphylaxis    Shellfish Containing Products Hives       MEDICATIONS  Current Outpatient Medications   Medication Sig Dispense Refill    ALPRAZolam (Xanax) 0.25 mg tablet Take 1 tablet (0.25 mg) by mouth 2 times a day as needed for anxiety. 30 tablet 0    ALPRAZolam (Xanax) 0.5 mg tablet if needed.      cephalexin (Keflex) 500 mg capsule Take 1 capsule (500 mg) by mouth 4 times a day for 7 days. 28 capsule 0    cholecalciferol (Vitamin D-3) 50 mcg (2,000 unit) capsule Take 1 capsule (50 mcg) by mouth.      cyanocobalamin (Vitamin B-12) 100 mcg tablet Take 1 tablet (100 mcg) by mouth once daily.      ergocalciferol (Vitamin D-2) 1.25 MG (86449 UT) capsule Take by mouth.      esomeprazole (NexIUM) 40 mg DR capsule TAKE 1 CAPSULE BY MOUTH DAILY TWICE DAILY      fluticasone (Flonase) 50 mcg/actuation nasal spray Administer into affected nostril(s).      fluticasone propion-salmeteroL (Advair Diskus) 250-50 mcg/dose diskus inhaler Inhale 1 puff 2 times a day. Rinse mouth with water after use to reduce aftertaste and incidence of candidiasis. Do not swallow. 60 each 0    loratadine (Claritin) 10 mg tablet Take 1 tablet (10 mg) by mouth.      metroNIDAZOLE (Metrocream) 0.75 % cream Apply 1 Application topically once daily. 45 g 11    mometasone (Asmanex Twisthaler) 220 mcg/ actuation (60) inhaler Inhale 1 puff once daily. 1 each 0    ondansetron (Zofran) 4 mg tablet Take 1 tablet (4 mg) by mouth every 8 hours if needed for vomiting or nausea.      ondansetron ODT (Zofran-ODT) 4 mg disintegrating tablet TAKE 1 TABLET BY MOUTH EVERY 6 HOURS AS NEEDED FOR  NAUSEA/VOMITING FOR UP TO 7 DAYS.      phenazopyridine (Pyridium) 100 mg tablet Take 1 tablet (100 mg) by mouth 3 times a day as needed for bladder spasms. 15 tablet 0    progesterone (Prometrium) 100 mg capsule Take 1 capsule (100 mg) by mouth once daily.      riboflavin (vitamin B2) 100 mg tablet tablet Take 1 tablet (100 mg) by mouth once daily.      SUMAtriptan (Imitrex) 20 mg/actuation nasal spray ADMINISTER 1 SPRAY (20 MG) INTO ONE NOSTRIL ONCE DAILY AS NEEDED FOR MIGRAINE. 6 each 2    tacrolimus (Protopic) 0.1 % ointment Apply topically 2 times a day. To face until rash is clear 60 g 11    terconazole (Terazol 3) 0.8 % vaginal cream Insert 1 applicator into the vagina once daily at bedtime.      venlafaxine XR (Effexor-XR) 37.5 mg 24 hr capsule Take 1 capsule (37.5 mg) by mouth once daily. 90 capsule 0    vortioxetine (Trintellix) 5 mg tablet tablet Take 1 tablet (5 mg) by mouth once daily. 30 tablet 1     No current facility-administered medications for this visit.         PAST HISTORY     PAST MEDICAL HISTORY  Anxiety  Migraine  GERD    PAST SURGICAL HISTORY  Past Surgical History:   Procedure Laterality Date    ENDOMETRIAL BIOPSY      polyp       IMMUNIZATION HISTORY  Immunization History   Administered Date(s) Administered    Pfizer COVID-19 vaccine, bivalent, age 12 years and older (30 mcg/0.3 mL) 11/18/2022    Pfizer Purple Cap SARS-CoV-2 03/17/2021, 04/07/2021, 11/07/2021    Tdap vaccine, age 7 year and older (BOOSTRIX, ADACEL) 08/27/2013       SOCIAL HISTORY  Tobacco Smoking: never  Smokeless Tobacco/Vaping: none  Illicit drugs: none  Alcohol consumption: rarely  Pets: dog    OCCUPATIONAL/ENVIRONMENTAL HISTORY  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  No exposure to birds or exotic animals.    FAMILY HISTORY  Family History   Problem Relation Name Age of Onset    Diabetes Mother      Hyperlipidemia Mother      Lung cancer Father       Hyperlipidemia Father      Heart attack Father      Depression Sister      Autism Son      Acute lymphoblastic leukemia Son       Mother - asthma  Son - lukemia  Father - lung cancer   No family history of autoimmune disorders.    REVIEW OF SYSTEMS     REVIEW OF SYSTEMS  Review of Systems    Constitutional: No fever, no chills, no night sweats.    Eyes: No double vision, no floaters, no dry eyes.   ENT: See HPI.   Neck: No neck stiffness.  Cardiovascular: No sharp chest pain, no heart racing, no leg swelling.  Respiratory: as noted in HPI.   Gastrointestinal: No nausea, no vomiting, no diarrhea.   Musculoskeletal: No joint pain, no back pain.   Integumentary: No rashes or sores.  Neurological: No dizziness, no headaches. Sleeping well.  Psychiatric: No mood changes.   Endocrine: No hot flashes, no cold intolerance, weight is stable.  Hematologic: No easy bruising or bleeding.    PHYSICAL EXAM     VITAL SIGNS: Ht 1.524 m (5')   Wt 66.2 kg (146 lb)   LMP 08/27/2024   BMI 28.51 kg/m²      CURRENT WEIGHT: Body mass index is 28.51 kg/m².  PREVIOUS WEIGHTS:  Wt Readings from Last 3 Encounters:   09/18/24 66.2 kg (146 lb)   09/09/24 65.8 kg (145 lb)   03/25/24 68 kg (150 lb)       Physical Exam    Constitutional: General appearance: Alert and oriented.  No acute distress. Well developed, well nourished.  Head and face: Symmetric  ENT: external inspection of ear and nose normal. No intranasal polyps. No oropharyngeal exudates.    Oropharynx: normal   Neck: supple, no lymphadenopathy  Pulmonary: Chest is normal. No increased work of breathing or signs of respiratory distress. Clear to auscultation bilaterally - no crackles, wheezing, or rhonchi.   Cardiovascular: Heart rate and rhythm normal. Normal S1, S2 - no murmurs, gallops, or pericardial rub.   Abdomen: Soft, non tender, +BS  Extremities: No edema. No clubbing or cyanosis of the fingernails.    Neurologic: Moves all four extremities   MSK: Normal movements of  "extremities. Gait normal   Psychiatric: Intact judgement and insight.    RESULTS/DATA     Pulmonary Function Test Results       Chest Radiograph     XR chest 2 views 09/09/2024    Narrative  Interpreted By:  Rachid Juárez,  STUDY:  XR CHEST 2 VIEWS;  9/9/2024 12:39 pm    INDICATION:  Signs/Symptoms:cough.    ,J40 Bronchitis, not specified as acute or chronic    COMPARISON:  None.    ACCESSION NUMBER(S):  [id number]    ORDERING CLINICIAN:  JAMEL GAITAN    FINDINGS:          CARDIOMEDIASTINAL SILHOUETTE:  Cardiomediastinal silhouette is normal in size and configuration.    LUNGS:  There is patchy airspace disease at the left lung base. The right  lung is clear. There is no effusion. There is no edema.    ABDOMEN:  No remarkable upper abdominal findings.    BONES:  No acute osseous changes.    Impression  1.  Patchy left basilar airspace disease with developing pneumonia in  the differential.        MACRO:  None    Signed by: Rachid Juárez 9/10/2024 9:44 PM  Dictation workstation:   [id number]      Chest CT Scan     No results found for this or any previous visit from the past 365 days.      Echocardiogram     No results found for this or any previous visit from the past 365 days.       Labwork     Lab Results   Component Value Date    WBC 6.9 07/01/2021    HGB 10.5 (L) 07/01/2021    HCT 35.3 (L) 07/01/2021    MCV 74 (L) 07/01/2021     07/01/2021      Lab Results   Component Value Date    GLUCOSE 94 01/14/2020    CALCIUM 9.5 01/14/2020     01/14/2020    K 4.2 01/14/2020    CO2 24 01/14/2020     01/14/2020    BUN 11 01/14/2020    CREATININE 0.82 01/14/2020      No results found for: \"ALT\", \"AST\", \"GGT\", \"ALKPHOS\", \"BILITOT\"     No results found for: \"PROTIME\", \"INR\", \"PTT\"    No results found for: \"ICIGE\", \"IGE\", \"ICA04\", \"ASPFU\", \"IGG\", \"IGA\", \"IGM\"    Peripheral Eosinophil Count/Percentage:   No results found for: \"EOSABS\", \"EOSPCT\"    ASSESSMENT/PLAN   Ms. Stevenson is a 43 y.o. female, " with a history of migraines, GERD, who is a never smoker, who presents to a [facility] for an initial evaluation for Pneumonia.     Will see how she feels in a week maybe order an different antibiotic.  Getting chest x-ray today to make sure no worsening pneumonia  If productive cough in 1 month can send cultures?    Problem List and Orders  Problem List Items Addressed This Visit    None  Visit Diagnoses       Community acquired pneumonia, unspecified laterality        Relevant Medications    predniSONE (Deltasone) 10 mg tablet    albuterol 2.5 mg /3 mL (0.083 %) nebulizer solution    Other Relevant Orders    Aerosol Machine for home use - Purchase            Assessment and Plan / Recommendations:  Problem List Items Addressed This Visit    None  Visit Diagnoses       Community acquired pneumonia, unspecified laterality                 Covid pneumonia  -Start prednisone taper  -Start albuterol nebulizers every 4-6 hours as needed for shortness of breath  -Will get chest x-ray today    Follow up in 1 month or sooner if needed.    If you have any questions please call the office [phone]    Thank you for visiting the Pulmonary clinic today!   Reanna Michel CNP  [phone]

## 2024-09-20 DIAGNOSIS — J18.9 COMMUNITY ACQUIRED PNEUMONIA, UNSPECIFIED LATERALITY: Primary | ICD-10-CM

## 2024-09-20 RX ORDER — METHYLPREDNISOLONE 4 MG/1
TABLET ORAL
Qty: 21 TABLET | Refills: 0 | Status: SHIPPED | OUTPATIENT
Start: 2024-09-20 | End: 2024-09-27

## 2024-10-10 DIAGNOSIS — F41.9 ANXIETY: ICD-10-CM

## 2024-10-11 RX ORDER — VENLAFAXINE HYDROCHLORIDE 37.5 MG/1
37.5 CAPSULE, EXTENDED RELEASE ORAL DAILY
Qty: 90 CAPSULE | Refills: 0 | Status: SHIPPED | OUTPATIENT
Start: 2024-10-11

## 2024-10-14 NOTE — PROGRESS NOTES
Patient: Qiana Stevenson    [id number]  : 1980 -- AGE 44 y.o.    Provider: Reanna VEGA- CNP     [place] [facility] [place]: 10/18/24              [facility]  Follow Up Visit Note        HISTORY OF PRESENT ILLNESS     The patient's referring provider is: No ref. provider found    HISTORY OF PRESENT ILLNESS   Qiana Stevenson is a 44 y.o. female with a history of migraines, GERD, who is a never smoker, who presents to a [facility] for a follow-up evaluation for pneumonia.    I have independently interviewed and examined the patient in the office and reviewed available records.    Current History    Since last visit she reports she is feeling significantly better, back to her baseline.  Has had no acute respiratory events.  She was unable to tolerate prednisone and Medrol Dosepak due to causing her an upset stomach, she was worried to get an ulcer.  She did use nebulizers which helped significantly.  Has appointment with PCP in a couple weeks, she is going to inquire about getting pneumonia vaccine.    24: On today's visit, the patient reports testing positive for COVID the second week of August, her symptoms were pretty bad for about 2 weeks.  She reports she was starting to feel better just had a little lingering cough and some mild fatigue she went to [place] weekend, her cough started to ramp back up, was feeling increase in fatigue walking around [place], had to go lay down, tested negative for COVID.  Went to see PCP on 2024 chest x-ray was suggestive for pneumonia, she was ordered doxyxline and an Asmanex inhaler. She reports having allergies to penicillins and has a hard time keeping down antibiotics, they make her stomach upset.  Doxycycline is making her throat sore so she was switched to cephalexin.  She has 1 or 2 days left of antibiotics with minimal improvement in symptoms.    She  reports cough, shortness of breath, chest tightness, occasional wheezing.  Cough is not very productive.  Reports feeling better when she sleeps on her stomach.    Previous pulmonary history: She has no history of recurrent infections, or lung disease as a child.  she had no previous lung hx, never on oxygen or inhaler therapy.     Inhalers/nebulized medications: Asmanex    Hospitalization History: She has not been hospitalized over the last year for breathing related problem.    Sleep history: Denies snoring, apnea, feeling tired during the day or taking naps during the day.       ALLERGIES AND MEDICATIONS     ALLERGIES  Allergies   Allergen Reactions    Iodine Anaphylaxis    Levofloxacin Rash    Penicillins Unknown, Dizziness, Other, Rash and Shortness of breath    Shellfish Derived Unknown, Hives and Anaphylaxis    Shellfish Containing Products Hives       MEDICATIONS  Current Outpatient Medications   Medication Sig Dispense Refill    albuterol 2.5 mg /3 mL (0.083 %) nebulizer solution Take 3 mL (2.5 mg) by nebulization 4 times a day as needed for wheezing or shortness of breath. 60 mL 3    ALPRAZolam (Xanax) 0.25 mg tablet Take 1 tablet (0.25 mg) by mouth 2 times a day as needed for anxiety. 30 tablet 0    ALPRAZolam (Xanax) 0.5 mg tablet if needed.      cholecalciferol (Vitamin D-3) 50 mcg (2,000 unit) capsule Take 1 capsule (50 mcg) by mouth.      cyanocobalamin (Vitamin B-12) 100 mcg tablet Take 1 tablet (100 mcg) by mouth once daily.      ergocalciferol (Vitamin D-2) 1.25 MG (56287 UT) capsule Take by mouth.      esomeprazole (NexIUM) 40 mg DR capsule TAKE 1 CAPSULE BY MOUTH DAILY TWICE DAILY      fluticasone (Flonase) 50 mcg/actuation nasal spray Administer into affected nostril(s).      fluticasone propion-salmeteroL (Advair Diskus) 250-50 mcg/dose diskus inhaler Inhale 1 puff 2 times a day. Rinse mouth with water after use to reduce aftertaste and incidence of candidiasis. Do not swallow. 60 each 0     loratadine (Claritin) 10 mg tablet Take 1 tablet (10 mg) by mouth.      metroNIDAZOLE (Metrocream) 0.75 % cream Apply 1 Application topically once daily. 45 g 11    mometasone (Asmanex Twisthaler) 220 mcg/ actuation (60) inhaler Inhale 1 puff once daily. 1 each 0    ondansetron (Zofran) 4 mg tablet Take 1 tablet (4 mg) by mouth every 8 hours if needed for vomiting or nausea.      ondansetron ODT (Zofran-ODT) 4 mg disintegrating tablet TAKE 1 TABLET BY MOUTH EVERY 6 HOURS AS NEEDED FOR NAUSEA/VOMITING FOR UP TO 7 DAYS.      phenazopyridine (Pyridium) 100 mg tablet Take 1 tablet (100 mg) by mouth 3 times a day as needed for bladder spasms. 15 tablet 0    predniSONE (Deltasone) 10 mg tablet Take 4 tabs (40mg) daily for 3 days, then 3 tabs (30mg) daily for 3 days,  2 tabs (20mg) daily for 3 days,  1 tab (10 mg) daily for 3 days. 30 tablet 0    progesterone (Prometrium) 100 mg capsule Take 1 capsule (100 mg) by mouth once daily.      riboflavin (vitamin B2) 100 mg tablet tablet Take 1 tablet (100 mg) by mouth once daily.      SUMAtriptan (Imitrex) 20 mg/actuation nasal spray ADMINISTER 1 SPRAY (20 MG) INTO ONE NOSTRIL ONCE DAILY AS NEEDED FOR MIGRAINE. 6 each 2    tacrolimus (Protopic) 0.1 % ointment Apply topically 2 times a day. To face until rash is clear 60 g 11    terconazole (Terazol 3) 0.8 % vaginal cream Insert 1 applicator into the vagina once daily at bedtime.      venlafaxine XR (Effexor-XR) 37.5 mg 24 hr capsule TAKE 1 CAPSULE BY MOUTH ONCE DAILY. 90 capsule 0    vortioxetine (Trintellix) 5 mg tablet tablet Take 1 tablet (5 mg) by mouth once daily. 30 tablet 1     No current facility-administered medications for this visit.         PAST HISTORY     PAST MEDICAL HISTORY  Anxiety  Migraine  GERD    PAST SURGICAL HISTORY  Past Surgical History:   Procedure Laterality Date    ENDOMETRIAL BIOPSY      polyp       IMMUNIZATION HISTORY  Immunization History   Administered Date(s) Administered    Pfizer COVID-19  vaccine, bivalent, age 12 years and older (30 mcg/0.3 mL) 11/18/2022    Pfizer Purple Cap SARS-CoV-2 03/17/2021, 04/07/2021, 11/07/2021    Tdap vaccine, age 7 year and older (BOOSTRIX, ADACEL) 08/27/2013       SOCIAL HISTORY  Tobacco Smoking: never  Smokeless Tobacco/Vaping: none  Illicit drugs: none  Alcohol consumption: rarely  Pets: dog    OCCUPATIONAL/ENVIRONMENTAL HISTORY  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  No exposure to birds or exotic animals.    FAMILY HISTORY  Family History   Problem Relation Name Age of Onset    Diabetes Mother      Hyperlipidemia Mother      Lung cancer Father      Hyperlipidemia Father      Heart attack Father      Depression Sister      Autism Son      Acute lymphoblastic leukemia Son       Mother - asthma  Son -leukemia  Father - lung cancer   No family history of autoimmune disorders.    REVIEW OF SYSTEMS     REVIEW OF SYSTEMS  Review of Systems    Constitutional: No fever, no chills, no night sweats.    Eyes: No double vision, no floaters, no dry eyes.   ENT: See HPI.   Neck: No neck stiffness.  Cardiovascular: No sharp chest pain, no heart racing, no leg swelling.  Respiratory: as noted in HPI.   Gastrointestinal: No nausea, no vomiting, no diarrhea.   Musculoskeletal: No joint pain, no back pain.   Integumentary: No rashes or sores.  Neurological: No dizziness, no headaches. Sleeping well.  Psychiatric: No mood changes.   Endocrine: No hot flashes, no cold intolerance, weight is stable.  Hematologic: No easy bruising or bleeding.    PHYSICAL EXAM     VITAL SIGNS:   Vitals:    10/18/24 1004   BP: 115/82   Pulse: 76   Resp: 18   Temp: 36.5 °C (97.7 °F)   SpO2: 99%          CURRENT WEIGHT: Body mass index is 28.87 kg/m².    PREVIOUS WEIGHTS:  Wt Readings from Last 3 Encounters:   09/18/24 66.2 kg (146 lb)   09/09/24 65.8 kg (145 lb)   03/25/24 68 kg (150 lb)       Physical  Exam    Constitutional: General appearance: Alert and oriented.  No acute distress. Well developed, well nourished.  Head and face: Symmetric  ENT: external inspection of ear and nose normal. No intranasal polyps. No oropharyngeal exudates.    Oropharynx: normal   Neck: supple, no lymphadenopathy  Pulmonary: Chest is normal. No increased work of breathing or signs of respiratory distress. Clear to auscultation bilaterally - no crackles, wheezing, or rhonchi.   Cardiovascular: Heart rate and rhythm normal. Normal S1, S2 - no murmurs, gallops, or pericardial rub.   Abdomen: Soft, non tender, +BS  Extremities: No edema. No clubbing or cyanosis of the fingernails.    Neurologic: Moves all four extremities   MSK: Normal movements of extremities. Gait normal   Psychiatric: Intact judgement and insight.    RESULTS/DATA     Pulmonary Function Test Results     No testing done     Chest Radiograph     XR CHEST 2 VIEWS;  9/18/2024       INDICATION:  Signs/Symptoms:pneumonia.      ,J18.9 Pneumonia, unspecified organism      COMPARISON:  09/09/2024      ACCESSION NUMBER(S):  [id number]      ORDERING CLINICIAN:  JAMEL GAITAN      FINDINGS:                  CARDIOMEDIASTINAL SILHOUETTE:  Cardiomediastinal silhouette is normal in size and configuration.      LUNGS:  Azygous lobe is present on the right, a normal variant. Patchy  infiltrate is present in the lingula consistent with pneumonia. Right  lung is clear. No pleural effusion is noted.      ABDOMEN:  No remarkable upper abdominal findings.      BONES:  No acute osseous changes. Dextro curve at the thoracolumbar junction  is unchanged.      IMPRESSION:  1.  Patchy infiltrate in the lingula consistent with pneumonia              MACRO:  None      Signed by: Zeenat Hanley 9/19/2024 8:26 AM  Dictation workstation:   [id number]    Chest CT Scan     No testing done       Echocardiogram     No testing done        Labwork     Lab Results   Component Value Date    WBC 6.9  "07/01/2021    HGB 10.5 (L) 07/01/2021    HCT 35.3 (L) 07/01/2021    MCV 74 (L) 07/01/2021     07/01/2021      Lab Results   Component Value Date    GLUCOSE 94 01/14/2020    CALCIUM 9.5 01/14/2020     01/14/2020    K 4.2 01/14/2020    CO2 24 01/14/2020     01/14/2020    BUN 11 01/14/2020    CREATININE 0.82 01/14/2020      No results found for: \"ALT\", \"AST\", \"GGT\", \"ALKPHOS\", \"BILITOT\"     No results found for: \"PROTIME\", \"INR\", \"PTT\"    No results found for: \"ICIGE\", \"IGE\", \"ICA04\", \"ASPFU\", \"IGG\", \"IGA\", \"IGM\"    Peripheral Eosinophil Count/Percentage:   No results found for: \"EOSABS\", \"EOSPCT\"    ASSESSMENT/PLAN   Ms. Stevenson is a 44 y.o. female, with a history of migraines, GERD, who is a never smoker, who presents to a [facility] for an initial evaluation for Pneumonia.     Will see how she feels in a week maybe order an different antibiotic.  Getting chest x-ray today to make sure no worsening pneumonia  If productive cough in 1 month can send cultures?    Problem List and Orders  Problem List Items Addressed This Visit    None        Assessment and Plan / Recommendations:  Problem List Items Addressed This Visit    None    Covid pneumonia  -Asymptomatic and feeling back to baseline    Follow up as needed.    If you have any questions please call the office [phone]    Thank you for visiting the Pulmonary clinic today!   Reanna Michel CNP  [phone]    "

## 2024-10-15 DIAGNOSIS — G43.119 INTRACTABLE MIGRAINE WITH AURA WITHOUT STATUS MIGRAINOSUS: ICD-10-CM

## 2024-10-15 DIAGNOSIS — F41.9 ANXIETY: ICD-10-CM

## 2024-10-18 ENCOUNTER — APPOINTMENT (OUTPATIENT)
Dept: PULMONOLOGY | Facility: CLINIC | Age: 44
End: 2024-10-18
Payer: COMMERCIAL

## 2024-10-18 VITALS
WEIGHT: 147.8 LBS | SYSTOLIC BLOOD PRESSURE: 115 MMHG | DIASTOLIC BLOOD PRESSURE: 82 MMHG | TEMPERATURE: 97.7 F | HEIGHT: 60 IN | BODY MASS INDEX: 29.02 KG/M2 | HEART RATE: 76 BPM | OXYGEN SATURATION: 99 % | RESPIRATION RATE: 18 BRPM

## 2024-10-18 DIAGNOSIS — J18.9 COMMUNITY ACQUIRED PNEUMONIA, UNSPECIFIED LATERALITY: Primary | ICD-10-CM

## 2024-10-18 PROCEDURE — 1036F TOBACCO NON-USER: CPT

## 2024-10-18 PROCEDURE — 99214 OFFICE O/P EST MOD 30 MIN: CPT

## 2024-10-18 PROCEDURE — 3008F BODY MASS INDEX DOCD: CPT

## 2024-10-18 ASSESSMENT — PAIN SCALES - GENERAL: PAINLEVEL_OUTOF10: 0-NO PAIN

## 2024-10-22 RX ORDER — SUMATRIPTAN 20 MG/1
1 SPRAY NASAL DAILY PRN
Qty: 6 EACH | Refills: 2 | Status: SHIPPED | OUTPATIENT
Start: 2024-10-22

## 2024-10-22 RX ORDER — ONDANSETRON 4 MG/1
4 TABLET, ORALLY DISINTEGRATING ORAL EVERY 12 HOURS PRN
Qty: 20 TABLET | Refills: 0 | Status: SHIPPED | OUTPATIENT
Start: 2024-10-22

## 2024-10-22 RX ORDER — ALPRAZOLAM 0.25 MG/1
0.25 TABLET ORAL 2 TIMES DAILY PRN
Qty: 30 TABLET | OUTPATIENT
Start: 2024-10-22

## 2024-11-12 ENCOUNTER — APPOINTMENT (OUTPATIENT)
Dept: PRIMARY CARE | Facility: CLINIC | Age: 44
End: 2024-11-12
Payer: COMMERCIAL

## 2024-11-12 VITALS
HEART RATE: 90 BPM | BODY MASS INDEX: 29.84 KG/M2 | WEIGHT: 148 LBS | SYSTOLIC BLOOD PRESSURE: 110 MMHG | HEIGHT: 59 IN | DIASTOLIC BLOOD PRESSURE: 80 MMHG | OXYGEN SATURATION: 98 %

## 2024-11-12 DIAGNOSIS — Z00.00 WELL ADULT EXAM: Primary | ICD-10-CM

## 2024-11-12 DIAGNOSIS — Z13.6 ENCOUNTER FOR SCREENING FOR CORONARY ARTERY DISEASE: ICD-10-CM

## 2024-11-12 DIAGNOSIS — G43.119 INTRACTABLE MIGRAINE WITH AURA WITHOUT STATUS MIGRAINOSUS: ICD-10-CM

## 2024-11-12 DIAGNOSIS — F41.9 ANXIETY: ICD-10-CM

## 2024-11-12 DIAGNOSIS — Z13.220 SCREENING FOR HYPERLIPIDEMIA: ICD-10-CM

## 2024-11-12 DIAGNOSIS — Z13.1 SCREENING FOR DIABETES MELLITUS: ICD-10-CM

## 2024-11-12 PROCEDURE — 3008F BODY MASS INDEX DOCD: CPT | Performed by: FAMILY MEDICINE

## 2024-11-12 PROCEDURE — 1036F TOBACCO NON-USER: CPT | Performed by: FAMILY MEDICINE

## 2024-11-12 PROCEDURE — 99396 PREV VISIT EST AGE 40-64: CPT | Performed by: FAMILY MEDICINE

## 2024-11-12 RX ORDER — VENLAFAXINE HYDROCHLORIDE 37.5 MG/1
37.5 CAPSULE, EXTENDED RELEASE ORAL DAILY
Qty: 90 CAPSULE | Refills: 3 | Status: SHIPPED | OUTPATIENT
Start: 2024-11-12

## 2024-11-12 RX ORDER — ALPRAZOLAM 0.25 MG/1
0.25 TABLET ORAL 2 TIMES DAILY PRN
Qty: 30 TABLET | Refills: 0 | Status: SHIPPED | OUTPATIENT
Start: 2024-11-12

## 2024-11-12 RX ORDER — ONDANSETRON 4 MG/1
4 TABLET, ORALLY DISINTEGRATING ORAL EVERY 12 HOURS PRN
Qty: 30 TABLET | Refills: 0 | Status: SHIPPED | OUTPATIENT
Start: 2024-11-12

## 2024-11-12 RX ORDER — SUMATRIPTAN 20 MG/1
1 SPRAY NASAL DAILY PRN
Qty: 18 EACH | Refills: 3 | Status: SHIPPED | OUTPATIENT
Start: 2024-11-12

## 2024-11-12 ASSESSMENT — PATIENT HEALTH QUESTIONNAIRE - PHQ9
2. FEELING DOWN, DEPRESSED OR HOPELESS: NOT AT ALL
SUM OF ALL RESPONSES TO PHQ9 QUESTIONS 1 AND 2: 0
1. LITTLE INTEREST OR PLEASURE IN DOING THINGS: NOT AT ALL

## 2024-11-12 NOTE — PROGRESS NOTES
"  Subjective   Patient ID: Qiana Stevenson is a 44 y.o. female who presents for Annual Exam (Patient is here for annual physical. She sees OBGYN for paps. ).  She is of course having a lot of stress about the recent election.    Past Medical, Surgical, Social and Family Hx reviewed-Yes    Any acute complaints?    no    Any chronic issues addressed today or Rx refills needed?    See below    Last pap 2023, abnormal but subsequent testing normal  Last Mammogram 2024  Colon CA screening Hx 2019  Labs reviewed  Up to date on immunizations yes  Dentist in the past year yes    Menstruation no concerns  Sexual Activity no concerns        PE:  /80   Pulse 90   Ht 1.499 m (4' 11\")   Wt 67.1 kg (148 lb)   LMP 10/12/2024   SpO2 98%   BMI 29.89 kg/m²   Alert adult woman, NAD  HEENT clear  Neck supple, No LAD  Heart RRR no murmur  Lungs CTA bilat  Abdomen benign, normal BS, soft NT/ND  Skin warm, dry, clear  Neuro grossly normal, gait WNL  Affect pleasant and appropriate, memory and judgement WNL      Assessment/Plan   Assessment & Plan  Well adult exam  Reviewed HM and vaccines       Intractable migraine with aura without status migrainosus    Orders:    ondansetron ODT (Zofran-ODT) 4 mg disintegrating tablet; Take 1 tablet (4 mg) by mouth every 12 hours if needed for nausea or vomiting.    SUMAtriptan (Imitrex) 20 mg/actuation nasal spray; Administer 1 spray (20 mg) into one nostril once daily as needed for migraine.    Anxiety    Orders:    ALPRAZolam (Xanax) 0.25 mg tablet; Take 1 tablet (0.25 mg) by mouth 2 times a day as needed for anxiety.    venlafaxine XR (Effexor-XR) 37.5 mg 24 hr capsule; Take 1 capsule (37.5 mg) by mouth once daily.    Encounter for screening for coronary artery disease    Orders:    CT cardiac scoring wo IV contrast; Future    Screening for hyperlipidemia    Orders:    Lipid Panel; Future    Screening for diabetes mellitus    Orders:    Hemoglobin A1C; Future           "

## 2024-11-17 NOTE — ASSESSMENT & PLAN NOTE
Orders:    ondansetron ODT (Zofran-ODT) 4 mg disintegrating tablet; Take 1 tablet (4 mg) by mouth every 12 hours if needed for nausea or vomiting.    SUMAtriptan (Imitrex) 20 mg/actuation nasal spray; Administer 1 spray (20 mg) into one nostril once daily as needed for migraine.

## 2024-11-17 NOTE — ASSESSMENT & PLAN NOTE
Orders:    ALPRAZolam (Xanax) 0.25 mg tablet; Take 1 tablet (0.25 mg) by mouth 2 times a day as needed for anxiety.    venlafaxine XR (Effexor-XR) 37.5 mg 24 hr capsule; Take 1 capsule (37.5 mg) by mouth once daily.

## 2025-01-19 ENCOUNTER — HOSPITAL ENCOUNTER (OUTPATIENT)
Dept: RADIOLOGY | Facility: HOSPITAL | Age: 45
Discharge: HOME | End: 2025-01-19
Payer: COMMERCIAL

## 2025-01-19 DIAGNOSIS — Z13.6 ENCOUNTER FOR SCREENING FOR CORONARY ARTERY DISEASE: ICD-10-CM

## 2025-01-19 PROCEDURE — 75571 CT HRT W/O DYE W/CA TEST: CPT

## 2025-02-24 DIAGNOSIS — F41.9 ANXIETY: ICD-10-CM

## 2025-02-26 RX ORDER — ALPRAZOLAM 0.25 MG/1
0.25 TABLET ORAL 2 TIMES DAILY PRN
Qty: 15 TABLET | Refills: 0 | Status: SHIPPED | OUTPATIENT
Start: 2025-02-26

## 2025-04-30 DIAGNOSIS — L71.0 PERIORAL DERMATITIS: ICD-10-CM

## 2025-04-30 RX ORDER — METRONIDAZOLE 7.5 MG/G
CREAM TOPICAL DAILY
Qty: 45 G | Refills: 11 | OUTPATIENT
Start: 2025-04-30

## 2025-05-24 DIAGNOSIS — G43.119 INTRACTABLE MIGRAINE WITH AURA WITHOUT STATUS MIGRAINOSUS: ICD-10-CM

## 2025-06-05 RX ORDER — SUMATRIPTAN 20 MG/1
1 SPRAY NASAL DAILY PRN
Qty: 6 EACH | Refills: 2 | Status: SHIPPED | OUTPATIENT
Start: 2025-06-05

## 2025-08-15 DIAGNOSIS — G43.119 INTRACTABLE MIGRAINE WITH AURA WITHOUT STATUS MIGRAINOSUS: ICD-10-CM

## 2025-08-21 RX ORDER — ONDANSETRON 4 MG/1
4 TABLET, ORALLY DISINTEGRATING ORAL EVERY 12 HOURS PRN
Qty: 9 TABLET | Refills: 2 | Status: SHIPPED | OUTPATIENT
Start: 2025-08-21